# Patient Record
Sex: MALE | URBAN - METROPOLITAN AREA
[De-identification: names, ages, dates, MRNs, and addresses within clinical notes are randomized per-mention and may not be internally consistent; named-entity substitution may affect disease eponyms.]

---

## 2023-06-29 ENCOUNTER — APPOINTMENT (RX ONLY)
Dept: URBAN - METROPOLITAN AREA CLINIC 55 | Facility: CLINIC | Age: 23
Setting detail: DERMATOLOGY
End: 2023-06-29

## 2023-06-29 DIAGNOSIS — B07.8 OTHER VIRAL WARTS: ICD-10-CM

## 2023-06-29 PROCEDURE — ? COUNSELING

## 2023-06-29 PROCEDURE — 99202 OFFICE O/P NEW SF 15 MIN: CPT | Mod: 25

## 2023-06-29 PROCEDURE — ? CANTHARIDIN

## 2023-06-29 PROCEDURE — 17110 DESTRUCTION B9 LES UP TO 14: CPT

## 2023-06-29 ASSESSMENT — LOCATION DETAILED DESCRIPTION DERM: LOCATION DETAILED: LEFT KNEE

## 2023-06-29 ASSESSMENT — LOCATION ZONE DERM: LOCATION ZONE: LEG

## 2023-06-29 ASSESSMENT — LOCATION SIMPLE DESCRIPTION DERM: LOCATION SIMPLE: LEFT KNEE

## 2023-06-29 NOTE — PROCEDURE: CANTHARIDIN
Medical Necessity Information: It is in your best interest to select a reason for this procedure from the list below. All of these items fulfill various CMS LCD requirements except the new and changing color options.
Add 52 Modifier (Optional): no
Medical Necessity Clause: This procedure was medically necessary because the lesions that were treated were:
Detail Level: Simple
Patsy Duration Text (Please Remove Duration From Postcare): The patient was instructed to leave the [facility] on for 6-8 hours and then wash the area well with soap and water.
Post-Care Instructions: I reviewed with the patient in detail post-care instructions. The patient understands that the treated areas should be washed off 6 to 8 hours after application.
Canthacur Duration Text (Please Remove Duration From Postcare): The patient was instructed to leave the [address] [place] [address] on for 6-8 hours and then wash the area well with soap and water.
Cantharone Plus Duration Text (Please Remove Duration From Postcare): The patient was instructed to leave the Cantharone Plus on for 6-8 hours and then wash the area well with soap and water.
Strength: Patsy
Canthacur Ps Duration Text (Please Remove Duration From Postcare): The patient was instructed to leave the [address] [place] [address] PS on for 6-8 hours and then wash the area well with soap and water.
Cantharone Forte Duration Text (Please Remove Duration From Postcare): The patient was instructed to leave the [facility] on for 6-8 hours and then wash the area well with soap and water.
Consent: The patient's consent was obtained including but not limited to risks of crusting, scabbing, scarring, blistering, darker or lighter pigmentary change, recurrence, incomplete removal and infection.